# Patient Record
Sex: FEMALE | Race: WHITE
[De-identification: names, ages, dates, MRNs, and addresses within clinical notes are randomized per-mention and may not be internally consistent; named-entity substitution may affect disease eponyms.]

---

## 2018-12-25 ENCOUNTER — HOSPITAL ENCOUNTER (EMERGENCY)
Dept: HOSPITAL 97 - ER | Age: 58
Discharge: ANOTHER HOSPITAL | End: 2018-12-25
Payer: COMMERCIAL

## 2018-12-25 VITALS — OXYGEN SATURATION: 100 %

## 2018-12-25 VITALS — SYSTOLIC BLOOD PRESSURE: 97 MMHG | DIASTOLIC BLOOD PRESSURE: 72 MMHG

## 2018-12-25 VITALS — TEMPERATURE: 98.2 F

## 2018-12-25 DIAGNOSIS — K27.0: Primary | ICD-10-CM

## 2018-12-25 LAB
ALBUMIN SERPL BCP-MCNC: 2.4 G/DL (ref 3.4–5)
ALP SERPL-CCNC: 104 U/L (ref 45–117)
ALT SERPL W P-5'-P-CCNC: 110 U/L (ref 12–78)
ANISOCYTOSIS BLD QL: (no result)
AST SERPL W P-5'-P-CCNC: 227 U/L (ref 15–37)
BUN BLD-MCNC: 16 MG/DL (ref 7–18)
GLUCOSE SERPLBLD-MCNC: 154 MG/DL (ref 74–106)
HCT VFR BLD CALC: 21.7 % (ref 36–45)
LIPASE SERPL-CCNC: 88 U/L (ref 73–393)
LYMPHOCYTES # SPEC AUTO: 0.6 K/UL (ref 0.7–4.9)
MORPHOLOGY BLD-IMP: (no result)
PMV BLD: 10.5 FL (ref 7.6–11.3)
POTASSIUM SERPL-SCNC: 3.9 MMOL/L (ref 3.5–5.1)
RBC # BLD: 2.59 M/UL (ref 3.86–4.86)

## 2018-12-25 PROCEDURE — 96375 TX/PRO/DX INJ NEW DRUG ADDON: CPT

## 2018-12-25 PROCEDURE — 80048 BASIC METABOLIC PNL TOTAL CA: CPT

## 2018-12-25 PROCEDURE — 36415 COLL VENOUS BLD VENIPUNCTURE: CPT

## 2018-12-25 PROCEDURE — 83690 ASSAY OF LIPASE: CPT

## 2018-12-25 PROCEDURE — 86901 BLOOD TYPING SEROLOGIC RH(D): CPT

## 2018-12-25 PROCEDURE — 86900 BLOOD TYPING SEROLOGIC ABO: CPT

## 2018-12-25 PROCEDURE — 99285 EMERGENCY DEPT VISIT HI MDM: CPT

## 2018-12-25 PROCEDURE — 93005 ELECTROCARDIOGRAM TRACING: CPT

## 2018-12-25 PROCEDURE — 71045 X-RAY EXAM CHEST 1 VIEW: CPT

## 2018-12-25 PROCEDURE — 85025 COMPLETE CBC W/AUTO DIFF WBC: CPT

## 2018-12-25 PROCEDURE — 96374 THER/PROPH/DIAG INJ IV PUSH: CPT

## 2018-12-25 PROCEDURE — 86850 RBC ANTIBODY SCREEN: CPT

## 2018-12-25 PROCEDURE — 80076 HEPATIC FUNCTION PANEL: CPT

## 2018-12-25 NOTE — ER
Nurse's Notes                                                                                     

 [facility]                                                                

Name: Marsha Manzo                                                                                 

Age: 58 yrs                                                                                       

Sex: Female                                                                                       

: 1960                                                                                   

MRN: [id number]                                                                                   

Arrival Date: 2018                                                                          

Time: 09:53                                                                                       

Account#: [id number]                                                                            

Bed 4                                                                                             

Private MD:                                                                                       

Diagnosis: Acute peptic ulcer, site unspecified, with hemorrhage                                  

                                                                                                  

Presentation:                                                                                     

                                                                                             

09:54 Presenting complaint: EMS states: fever, cough, congestion, vomiting, dark red stool    iw  

      since Thursday. Transition of care: patient was not received from another setting of        

      care. Onset of symptoms was 2018. Risk Assessment: Do you want to hurt         

      yourself or someone else? Patient reports no desire to harm self or others. Initial         

      Sepsis Screen: Does the patient meet any 2 criteria? No. Patient's initial sepsis           

      screen is negative. Does the patient have a suspected source of infection? No.              

      Patient's initial sepsis screen is negative. Care prior to arrival: Medication(s)           

      given: Normal saline infusion, 500 mL, IV initiated. 18 GA, in the right antecubital        

      area.                                                                                       

09:54 Method Of Arrival: EMS: [facility]                                                iw  

09:54 Acuity: COLETTE 3                                                                           iw  

10:04 Acuity: COLETTE 2                                                                           hb  

                                                                                                  

Historical:                                                                                       

- Allergies:                                                                                      

10:21 No Known Drug Allergies;                                                                hb  

- PSHx:                                                                                           

09:55 ;                                                                              iw  

                                                                                                  

- Immunization history:: Adult Immunizations up to date.                                          

- Social history:: Patient/guardian denies using alcohol, street drugs, The patient               

  lives with family, Smoking status: Patient/guardian denies using tobacco.                       

- Ebola Screening: : Patient negative for fever greater than or equal to 101.5 degrees            

  Fahrenheit, and additional compatible Ebola Virus Disease symptoms Patient denies               

  exposure to infectious person Patient denies travel to an Ebola-affected area in the            

  21 days before illness onset No symptoms or risks identified at this time.                      

- Family history:: not pertinent.                                                                 

                                                                                                  

                                                                                                  

Screening:                                                                                        

10:05 Abuse screen: Denies threats or abuse. Denies injuries from another. Nutritional        hb  

      screening: No deficits noted. Tuberculosis screening: No symptoms or risk factors           

      identified. Fall Risk Total Guillory Fall Scale indicates Low Risk Score (25-44 pts). Fall     

      prevention measures have been instituted. Side Rails Up X 2 Frequent Obs/Assesments         

      occuring Family Present and informed to notify staff if they need to leave bedside As       

      available Patient and Family Educated on Fall Prevention Program and strategies.            

                                                                                                  

Assessment:                                                                                       

10:10 General: Appears in no apparent distress. ill, Behavior is calm, cooperative. Pain:     hb  

      Denies pain. Neuro: Level of Consciousness is awake, alert, obeys commands, Oriented to     

      person, place, time, situation. Cardiovascular: Capillary refill < 3 seconds Patient's      

      skin is warm and dry. Respiratory: Airway is patent Trachea midline Respiratory effort      

      is even, unlabored, Respiratory pattern is regular, symmetrical, Breath sounds are          

      clear bilaterally. GI: Abdomen is non-distended, Bowel sounds present X 4 quads. Abd is     

      soft and non tender X 4 quads. Reports rectal bleeding. : No signs and/or symptoms        

      were reported regarding the genitourinary system. EENT: No signs and/or symptoms were       

      reported regarding the EENT system. Derm: Skin is intact, is healthy with good turgor,      

      Skin is dry, Skin is pale, Skin temperature is warm. Musculoskeletal: No signs and/or       

      symptoms reported regarding the musculoskeletal system.                                     

11:00 Reassessment: Patient appears in no apparent distress at this time. No changes from     hb  

      previously documented assessment. Patient and/or family updated on plan of care and         

      expected duration. Pain level reassessed. Patient is alert, oriented x 3, equal             

      unlabored respirations, skin warm/dry/pink.                                                 

12:00 Reassessment: Patient appears in no apparent distress at this time. No changes from     hb  

      previously documented assessment. Patient and/or family updated on plan of care and         

      expected duration. Pain level reassessed. Patient is alert, oriented x 3, equal             

      unlabored respirations, skin warm/dry/pink. Family at bedside. Awaiting blood from          

      blood bank.                                                                                 

13:00 Reassessment: Awaiting blood from blood bank, family remains at bedside. SBP 80s-110,   hb  

      Dr. Urbano aware.                                                                          

13:37 Reassessment: First unit PRBCs started. Family at bedside. Transfer pending.            hb  

14:30 Reassessment: Blood infusion continues, NAD. Family remains at bedside.                 hb  

14:39 Reassessment: Report called to Cristy TELLEZ at [facility].                                        hb  

15:30 Reassessment: Second unit PRBCs started, see transfusion flowsheet.                     hb  

16:00 Reassessment: Blood infusion continues, awaiting transportation at this time. VSS. NAD. hb  

      Denies pain. No bleeding or vomiting seen this visit. Family remains at bedside.            

                                                                                                  

Vital Signs:                                                                                      

09:55 BP 91 / 68; Pulse 100; Resp 16 S; Temp 98.2; Pulse Ox 100% on R/A;                      iw  

10:04 BP 84 / 63;                                                                             hb  

10:30 BP 88 / 56; Pulse 99; Resp 18; Pulse Ox 100% on R/A;                                    hb  

11:00 BP 82 / 50; Pulse 98; Resp 16; Pulse Ox 100% on R/A; Pain 2/10;                         hb  

12:00 BP 95 / 66; Pulse 88; Resp 16; Pulse Ox 99% on R/A;                                     hb  

12:30 BP 97 / 48; Pulse 101; Resp 15; Pulse Ox 100% on R/A;                                   hb  

13:30  / 74; Pulse 91; Resp 15; Pulse Ox 100% on R/A;                                   hb  

13:45 BP 89 / 65; Pulse 91; Resp 15; Pulse Ox 100% on R/A;                                    hb  

14:15  / 68; Pulse 89; Resp 16; Pulse Ox 100% on R/A;                                   hb  

14:30 BP 97 / 72; Pulse 87; Resp 15; Pulse Ox 100% on R/A;                                    hb  

15:30  / 68; Pulse 91; Resp 16; Pulse Ox 100% on R/A;                                   hb  

16:30  / 74; Pulse 88; Resp 16; Pulse Ox 100% on R/A;                                   hb  

                                                                                                  

ED Course:                                                                                        

09:53 Patient arrived in ED.                                                                  iw  

09:55 Triage completed.                                                                       iw  

09:55 Arm band placed on.                                                                     iw  

09:58 Ana Urbano MD is Attending Physician.                                           ma2 

10:04 Maddie Marte, RN is Primary Nurse.                                                   hb  

10:05 EKG done, by EKG tech. reviewed by Ana Urbano MD.                                 at1 

10:10 Patient has correct armband on for positive identification. Placed in gown. Bed in low  hb  

      position. Call light in reach. Side rails up X2.                                            

10:10 Maintain EMS IV. Dressing intact. Good blood return noted. Site clean \T\ dry. Gauge \T\    hb

      site: 20g RIGHT AC.                                                                         

10:30 Chest Single View XRAY In Process Unspecified.                                          EDMS

12:04 contacted [facility] both declined due to lack of         bd  

      capacity.                                                                                   

12:18 contacted [facility], pt was denied for lack of capacity.                       bd  

12:24 contacted [facility], pt was denied due to lack of capacity.                   bd  

13:19 Inserted saline lock: 22 gauge in left antecubital area, using aseptic technique.       hj  

16:30 No provider procedures requiring assistance completed. Patient transferred, IV remains  hb  

      in place.                                                                                   

                                                                                                  

Administered Medications:                                                                         

10:20 Drug: Pantoprazole 80 mg Route: IVP; Site: right antecubital;                           hb  

11:00 Follow up: Response: No adverse reaction                                                hb  

10:20 Drug: Rocephin 1 grams Route: IV; Rate: calculated rate; Site: right antecubital;       hb  

11:28 Drug: Pantoprazole 8 mg/hr Route: IV; Rate: 25 ml/hr; Site: right antecubital;          hb  

                                                                                                  

                                                                                                  

Outcome:                                                                                          

13:01 ER care complete, transfer ordered by MD. shukla 

16:30 Transferred by ground EMS to [facility].                             hb  

16:30 Condition: stable                                                                           

16:30 Instructed on the need for transfer, Demonstrated understanding of instructions.            

16:46 Patient left the ED.                                                                    hb  

                                                                                                  

Signatures:                                                                                       

Dispatcher [employer]                           EDMS                                                 

Chiara Land Irene, RN                     GEOFF   iw                                                   

Geovanna Orozco, EKG Tech              EKG Tat1                                                  

Ole Coles RN RN hj Baxter, Heather, RN RN hb Alzahri, Mohammad, MD MD ma2                                                  

                                                                                                  

Corrections: (The following items were deleted from the chart)                                    

14:01 13:00 Reassessment: Awaiting blood from blood bank, family remains at bedside. SBP      hb  

      , Dr. Urbano aware. hb                                                               

                                                                                                  

**************************************************************************************************

## 2018-12-25 NOTE — XMS REPORT
[facility]

 Created on:2018



Patient:Marsha Manzo

Sex:Female

:1960

External Reference #:[id number]





Demographics







 Address  [address] [place] [address]

 

 Phone  [phone]

 

 Preferred Language  en

 

 Marital Status  Unknown

 

 Jehovah's witness Affiliation  Unknown

 

 Race  White

 

 Ethnic Group  Not  or 









Author







 Organization  [employer]









Care Team Providers







 Name  Role  Phone

 

 Iyer, Na  Provider Role  Unavailable









Allergies

No Known Allergies



Problems







 Problem Type  Condition  Code  Onset Dates  Condition Status

 

 Problem  History of GI bleed  Z87.19    Active

 

 Problem  Pain in left knee  M25.562    Active

 

 Problem  Other chronic pain  G89.29    Active

 

 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

 

 Assessment  Gastroesophageal reflux disease  K21.9    Active



   without esophagitis      

 

 Problem  Gastroesophageal reflux disease  K21.9    Active



   without esophagitis      

 

 Problem  Cyst of skin  L72.9    Active







Medications







 Medication  Code  Code  Instructions  Start  End  Status  Dosage



   System      Date  Date    

 

 Pantoprazole  NDC  17891203015  40 MG Orally  March    Active  1 tablet



 Sodium      Once a day  2018      







Results

No Known Results



Summary Purpose

[employer] Submission

## 2018-12-25 NOTE — EDPHYS
Physician Documentation                                                                           

 [facility]                                                                

Name: Marsha Manzo                                                                                 

Age: 58 yrs                                                                                       

Sex: Female                                                                                       

: 1960                                                                                   

MRN: [id number]                                                                                   

Arrival Date: 2018                                                                          

Time: 09:53                                                                                       

Account#: [id number]                                                                            

Bed 4                                                                                             

Private MD:                                                                                       

ED Physician Ana Urbano                                                                    

HPI:                                                                                              

                                                                                             

10:04 This 58 yrs old  Female presents to ER via EMS with complaints of Vomiting,    ma2 

      Bloody Stools.                                                                              

10:04 The patient presents to the emergency department with lower gi bleeding . Onset: The    ma2 

      symptoms/episode began/occurred suddenly, 2 day(s) ago. Possible causes: unknown.           

      Associated signs and symptoms: Pertinent negatives: abdominal pain, anorexia. Severity      

      of symptoms: At their worst the symptoms were moderate in the emergency department the      

      symptoms are unchanged. The patient has experienced a previous episode, hx of PUD w         

      bleeding ulcer .                                                                            

                                                                                                  

Historical:                                                                                       

- Allergies:                                                                                      

10:21 No Known Drug Allergies;                                                                hb  

- PSHx:                                                                                           

09:55 ;                                                                              iw  

                                                                                                  

- Immunization history:: Adult Immunizations up to date.                                          

- Social history:: Patient/guardian denies using alcohol, street drugs, The patient               

  lives with family, Smoking status: Patient/guardian denies using tobacco.                       

- Ebola Screening: : Patient negative for fever greater than or equal to 101.5 degrees            

  Fahrenheit, and additional compatible Ebola Virus Disease symptoms Patient denies               

  exposure to infectious person Patient denies travel to an Ebola-affected area in the            

  21 days before illness onset No symptoms or risks identified at this time.                      

- Family history:: not pertinent.                                                                 

                                                                                                  

                                                                                                  

ROS:                                                                                              

10:04 Constitutional: Negative for fever, chills, and weight loss, Neck: Negative for injury, ma2 

      pain, and swelling.                                                                         

10:04 Abdomen/GI: Positive for abdominal pain, black/tarry stool, rectal bleeding, Negative       

      for nausea and vomiting, nausea, vomiting, and diarrhea.                                    

10:04 All other systems are negative.                                                             

                                                                                                  

Exam:                                                                                             

10:04 Head/Face:  Normocephalic, atraumatic. ENT:  Nares patent. No nasal discharge, no       ma2 

      septal abnormalities noted.  Tympanic membranes are normal and external auditory canals     

      are clear.  Oropharynx with no redness, swelling, or masses, exudates, or evidence of       

      obstruction, uvula midline.  Mucous membranes moist. Chest/axilla:  Normal chest wall       

      appearance and motion.  Nontender with no deformity.  No lesions are appreciated.           

      Cardiovascular:  Regular rate and rhythm with a normal S1 and S2.  No gallops, murmurs,     

      or rubs.  Normal PMI, no JVD.  No pulse deficits. Respiratory:  Lungs have equal breath     

      sounds bilaterally, clear to auscultation and percussion.  No rales, rhonchi or wheezes     

      noted.  No increased work of breathing, no retractions or nasal flaring. Abdomen/GI:        

      Soft, non-tender, with normal bowel sounds.  No distension or tympany.  No guarding or      

      rebound.  No evidence of tenderness throughout. MS/ Extremity:  Pulses equal, no            

      cyanosis.  Neurovascular intact.  Full, normal range of motion. Neuro:  Awake and           

      alert, GCS 15, oriented to person, place, time, and situation.  Cranial nerves II-XII       

      grossly intact.  Motor strength 5/5 in all extremities.  Sensory grossly intact.            

      Cerebellar exam normal.  Normal gait.                                                       

10:04 Constitutional: The patient appears pale.                                                   

                                                                                                  

Vital Signs:                                                                                      

09:55 BP 91 / 68; Pulse 100; Resp 16 S; Temp 98.2; Pulse Ox 100% on R/A;                      iw  

10:04 BP 84 / 63;                                                                             hb  

10:30 BP 88 / 56; Pulse 99; Resp 18; Pulse Ox 100% on R/A;                                    hb  

11:00 BP 82 / 50; Pulse 98; Resp 16; Pulse Ox 100% on R/A; Pain 2/10;                         hb  

12:00 BP 95 / 66; Pulse 88; Resp 16; Pulse Ox 99% on R/A;                                     hb  

12:30 BP 97 / 48; Pulse 101; Resp 15; Pulse Ox 100% on R/A;                                   hb  

13:30  / 74; Pulse 91; Resp 15; Pulse Ox 100% on R/A;                                   hb  

13:45 BP 89 / 65; Pulse 91; Resp 15; Pulse Ox 100% on R/A;                                    hb  

14:15  / 68; Pulse 89; Resp 16; Pulse Ox 100% on R/A;                                   hb  

14:30 BP 97 / 72; Pulse 87; Resp 15; Pulse Ox 100% on R/A;                                    hb  

15:30  / 68; Pulse 91; Resp 16; Pulse Ox 100% on R/A;                                   hb  

16:30  / 74; Pulse 88; Resp 16; Pulse Ox 100% on R/A;                                   hb  

                                                                                                  

MDM:                                                                                              

10:01 Patient medically screened.                                                             ma2 

10:04 Differential diagnosis: cholecystitis, pancreatitis, appendicitis, gastroenteritis, gi  [facility] 

      bleeding.                                                                                   

10:06 Data reviewed: vital signs, nurses notes, EMS record. Counseling: I had a detailed      [facility] 

      discussion with the patient and/or guardian regarding: the historical points, exam          

      findings, and any diagnostic results supporting the discharge/admit diagnosis, the need     

      to transfer to another facility. ED course: her MAP is normal SBP 95 pulse 110 bpm i        

      recommended emergency transfer for higher level of care for emergent GI endoscope no gi     

      available in our facility.. she would like to wait before we initiate transfer until        

      her  arrives .                                                                       

12:57 Response to treatment: the patient's symptoms have mildly improved after treatment.     ma2 

12:57 ED course: patient has active GI bleed, needs emergent GI not available in our          [facility] 

      hospital.. will transfer emergently for higher level of care.. stable for transfer .        

                                                                                                  

                                                                                             

10:01 Order name: Basic Metabolic Panel; Complete Time: 10:47                                 ma2 

                                                                                             

10:01 Order name: CBC with Diff; Complete Time: 11:42                                         ma2 

                                                                                             

10:01 Order name: Creatinine for Radiology; Complete Time: 10:47                              ma2 

                                                                                             

10:01 Order name: Hepatic Function; Complete Time: 10:47                                      ma2 

                                                                                             

10:01 Order name: Lipase; Complete Time: 10:47                                                ma2 

                                                                                             

10:08 Order name: Type And Screen                                                             bd  

                                                                                             

10:01 Order name: Chest Single View XRAY; Complete Time: 11:42                                ma2 

                                                                                             

10:52 Order name: Manual Differential; Complete Time: 11:42                                   [facility]

                                                                                             

11:32 Order name: ABO rpt                                                                     [facility]

                                                                                             

11:32 Order name: Packed RBCs (Additional Unit)                                               [facility]

                                                                                             

10:01 Order name: IV Saline Lock; Complete Time: 10:27                                        ma2 

                                                                                             

10:01 Order name: Labs collected and sent; Complete Time: 10:27                               ma2 

                                                                                                  

Administered Medications:                                                                         

10:20 Drug: Pantoprazole 80 mg Route: IVP; Site: right antecubital;                           hb  

11:00 Follow up: Response: No adverse reaction                                                hb  

10:20 Drug: Rocephin 1 grams Route: IV; Rate: calculated rate; Site: right antecubital;       hb  

11:28 Drug: Pantoprazole 8 mg/hr Route: IV; Rate: 25 ml/hr; Site: right antecubital;          hb  

                                                                                                  

                                                                                                  

Disposition:                                                                                      

18 13:01 Transfer ordered to [facility]. Diagnosis is Acute peptic      

  ulcer, site unspecified, with hemorrhage.                                                       

- Reason for transfer: Higher level of care.                                                      

- Accepting physician is Rashid.                                                                   

- Condition is Stable.                                                                            

- Problem is chronic.                                                                             

- Symptoms have worsened.                                                                         

                                                                                                  

                                                                                                  

                                                                                                  

Signatures:                                                                                       

Dispatcher [employer]                           EDMS                                                 

Ceci Talavera RN RN                                                      

Maddie Marte RN RN                                                      

Ana Urbano MD MD   ma2                                                  

                                                                                                  

Corrections: (The following items were deleted from the chart)                                    

13:17 13:01 2018 13:01 Transfer ordered to [facility]. Diagnosis is ma2 

      Acute peptic ulcer, site unspecified, with hemorrhage. Reason for transfer: Higher          

      level of care. Accepting physician is BETSY. Condition is Stable. Problem is chronic.          

      Symptoms have worsened. ma2                                                                 

14:22 10:46 BiPap ([employer] Only)+RC.RAD.BRZ ordered. [facility]

16:46 13:17 2018 13:01 Transfer ordered to [facility]. Diagnosis is hb  

      Acute peptic ulcer, site unspecified, with hemorrhage. Reason for transfer: Higher          

      level of care. Accepting physician is Rashid. Condition is Stable. Problem is chronic.       

      Symptoms have worsened. ma2                                                                 

                                                                                                  

**************************************************************************************************

## 2018-12-25 NOTE — RAD REPORT
EXAM DESCRIPTION:  Juan Single View12/25/2018 10:30 am

 

CLINICAL HISTORY:  cough

 

COMPARISON:  2015

 

FINDINGS:   The lungs appear clear of acute infiltrate. The heart is normal size

 

IMPRESSION:   No acute abnormalities displayed

## 2018-12-25 NOTE — XMS REPORT
[facility]

 Created on:2018



Patient:Marsha Manzo

Sex:Female

:1960

External Reference #:[id number]





Demographics







 Address  [address] [place] [address]

 

 Phone  [phone]

 

 Preferred Language  en

 

 Marital Status  Unknown

 

 [employer] Affiliation  Unknown

 

 Race  White

 

 Ethnic Group  Unknown









Author







 Organization  [employer]









Care Team Providers







 Name  Role  Phone

 

 Justen Bebe  Provider Role  Unavailable









Allergies, Adverse Reactions, Alerts







 Substance  Reaction  Event Type

 

 N.K.D.A.  Info Not Available  Non Drug Allergy







Problems







 Problem Type  Condition  Code  Onset Dates  Condition Status

 

 Assessment  Pharyngitis, unspecified etiology  J02.9    Active

 

 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

 

 Problem  Other chronic pain  G89.29    Active

 

 Problem  Pharyngitis, unspecified etiology  J02.9    Active

 

 Problem  Gastroesophageal reflux disease  K21.9    Active



   without esophagitis      

 

 Problem  Cyst of skin  L72.9    Active

 

 Problem  History of GI bleed  Z87.19    Active

 

 Problem  Pain in left knee  M25.562    Active







Medications







 Medication  Code  Code  Instructions  Start  End  Status  Dosage



   System      Date  Date    

 

 Vitamin B-1  NDC  [address]-20        Active  not



               defined

 

 Vitamin B-12  NDC  42351-20914        Active  not



               defined

 

 Lidocaine  NDC  73316859388  2 %    Active  10 ml to



 Viscous      Mouth/Throat  2018,     for



       every 3 hrs        swish and



                swallow

 

 Medrol  NDC  40483498579  4 MG Orally as  ,    Active  as



       directed        directed

 

 Pantoprazole  NDC  32152737086  40 MG Orally      Active  1 tablet



 Sodium      Once a day        







Results







 Name  Result  Date  Reference Range  Unit  Abnormality Flag

 

 STREP A RAPID          

 

 ----Result  Negative  2018      







Summary Purpose

[employer] Submission

## 2018-12-27 NOTE — EKG
Test Date:    2018-12-25               Test Time:    09:58:22

Technician:   JANETTE                                     

                                                     

MEASUREMENT RESULTS:                                       

Intervals:                                           

Rate:         97                                     

OR:           172                                    

QRSD:         92                                     

QT:           380                                    

QTc:          482                                    

Axis:                                                

P:            39                                     

OR:           172                                    

QRS:          19                                     

T:            68                                     

                                                     

INTERPRETIVE STATEMENTS:                                       

                                                     

Normal sinus rhythm

Prolonged QT

Abnormal ECG

Compared to ECG 05/30/2015 11:18:42

Prolonged QT interval now present

Sinus tachycardia no longer present



Electronically Signed On 12-27-18 08:58:02 CST by Yeison Anguiano

## 2020-01-14 ENCOUNTER — HOSPITAL ENCOUNTER (EMERGENCY)
Dept: HOSPITAL 97 - ER | Age: 60
Discharge: ANOTHER HOSPITAL | End: 2020-01-14
Payer: COMMERCIAL

## 2020-01-14 VITALS — SYSTOLIC BLOOD PRESSURE: 113 MMHG | DIASTOLIC BLOOD PRESSURE: 81 MMHG

## 2020-01-14 VITALS — TEMPERATURE: 98.2 F | OXYGEN SATURATION: 100 %

## 2020-01-14 DIAGNOSIS — S30.1XXA: ICD-10-CM

## 2020-01-14 DIAGNOSIS — R11.0: ICD-10-CM

## 2020-01-14 DIAGNOSIS — D64.9: Primary | ICD-10-CM

## 2020-01-14 LAB
ALBUMIN SERPL BCP-MCNC: 2.2 G/DL (ref 3.4–5)
ALP SERPL-CCNC: 140 U/L (ref 45–117)
ALT SERPL W P-5'-P-CCNC: 25 U/L (ref 12–78)
ANISOCYTOSIS BLD QL: (no result)
AST SERPL W P-5'-P-CCNC: 89 U/L (ref 15–37)
BLD SMEAR INTERP: (no result)
BUN BLD-MCNC: 4 MG/DL (ref 7–18)
GLUCOSE SERPLBLD-MCNC: 126 MG/DL (ref 74–106)
HCT VFR BLD CALC: 24.1 % (ref 36–45)
HYPOCHROMIA BLD QL: (no result)
INR BLD: 1.63
LIPASE SERPL-CCNC: 63 U/L (ref 73–393)
LYMPHOCYTES # SPEC AUTO: 0.6 K/UL (ref 0.7–4.9)
MAGNESIUM SERPL-MCNC: 2 MG/DL (ref 1.8–2.4)
MORPHOLOGY BLD-IMP: (no result)
NT-PROBNP SERPL-MCNC: 76 PG/ML (ref ?–125)
PMV BLD: 8.5 FL (ref 7.6–11.3)
POTASSIUM SERPL-SCNC: 4 MMOL/L (ref 3.5–5.1)
RBC # BLD: 2.94 M/UL (ref 3.86–4.86)
TROPONIN (EMERG DEPT USE ONLY): < 0.02 NG/ML (ref 0–0.04)

## 2020-01-14 PROCEDURE — 36415 COLL VENOUS BLD VENIPUNCTURE: CPT

## 2020-01-14 PROCEDURE — 86850 RBC ANTIBODY SCREEN: CPT

## 2020-01-14 PROCEDURE — 93005 ELECTROCARDIOGRAM TRACING: CPT

## 2020-01-14 PROCEDURE — 80048 BASIC METABOLIC PNL TOTAL CA: CPT

## 2020-01-14 PROCEDURE — 84484 ASSAY OF TROPONIN QUANT: CPT

## 2020-01-14 PROCEDURE — 80076 HEPATIC FUNCTION PANEL: CPT

## 2020-01-14 PROCEDURE — 86901 BLOOD TYPING SEROLOGIC RH(D): CPT

## 2020-01-14 PROCEDURE — 99285 EMERGENCY DEPT VISIT HI MDM: CPT

## 2020-01-14 PROCEDURE — 71045 X-RAY EXAM CHEST 1 VIEW: CPT

## 2020-01-14 PROCEDURE — 85610 PROTHROMBIN TIME: CPT

## 2020-01-14 PROCEDURE — 86900 BLOOD TYPING SEROLOGIC ABO: CPT

## 2020-01-14 PROCEDURE — 30233N1 TRANSFUSION OF NONAUTOLOGOUS RED BLOOD CELLS INTO PERIPHERAL VEIN, PERCUTANEOUS APPROACH: ICD-10-PCS

## 2020-01-14 PROCEDURE — 74177 CT ABD & PELVIS W/CONTRAST: CPT

## 2020-01-14 PROCEDURE — 83735 ASSAY OF MAGNESIUM: CPT

## 2020-01-14 PROCEDURE — 83880 ASSAY OF NATRIURETIC PEPTIDE: CPT

## 2020-01-14 PROCEDURE — 83690 ASSAY OF LIPASE: CPT

## 2020-01-14 PROCEDURE — 85025 COMPLETE CBC W/AUTO DIFF WBC: CPT

## 2020-01-14 PROCEDURE — 36430 TRANSFUSION BLD/BLD COMPNT: CPT

## 2020-01-14 NOTE — XMS REPORT
[facility]

 Created on:2018



Patient:Marsha Manzo

Sex:Female

:1960

External Reference #:[id number]





Demographics







 Address  [address] [place] [address]

 

 Phone  [phone]

 

 Preferred Language  en

 

 Marital Status  Unknown

 

 [employer] Affiliation  Unknown

 

 Race  White

 

 Ethnic Group  Unknown









Author







 Organization  [employer]









Care Team Providers







 Name  Role  Phone

 

 Justen Bebe  Provider Role  Unavailable









Allergies, Adverse Reactions, Alerts







 Substance  Reaction  Event Type

 

 N.K.D.A.  Info Not Available  Non Drug Allergy







Problems







 Problem Type  Condition  Code  Onset Dates  Condition Status

 

 Assessment  Pharyngitis, unspecified etiology  J02.9    Active

 

 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

 

 Problem  Other chronic pain  G89.29    Active

 

 Problem  Pharyngitis, unspecified etiology  J02.9    Active

 

 Problem  Gastroesophageal reflux disease  K21.9    Active



   without esophagitis      

 

 Problem  Cyst of skin  L72.9    Active

 

 Problem  History of GI bleed  Z87.19    Active

 

 Problem  Pain in left knee  M25.562    Active







Medications







 Medication  Code  Code  Instructions  Start  End  Status  Dosage



   System      Date  Date    

 

 Vitamin B-1  NDC  [address]-48        Active  not



               defined

 

 Vitamin B-12  NDC  48415-40956        Active  not



               defined

 

 Lidocaine  NDC  25694018063  2 %    Active  10 ml to



 Viscous      Mouth/Throat  2018,     for



       every 3 hrs        swish and



                swallow

 

 Medrol  NDC  02372749189  4 MG Orally as  ,    Active  as



       directed        directed

 

 Pantoprazole  NDC  72846342423  40 MG Orally      Active  1 tablet



 Sodium      Once a day        







Results







 Name  Result  Date  Reference Range  Unit  Abnormality Flag

 

 STREP A RAPID          

 

 ----Result  Negative  2018      







Summary Purpose

[employer] Submission

## 2020-01-14 NOTE — XMS REPORT
Patient Summary Document

 Created on:2020



Patient:TODD WOOTEN

Sex:Female

:1960

External Reference #:[id number]





Demographics







 Address  [address] [place] [address]

 

 Home Phone  [phone]

 

 Work Phone  [phone]

 

 Email Address  NONE

 

 Preferred Language  Unknown

 

 Marital Status  Unknown

 

 Yazdanism Affiliation  Unknown

 

 Race  Unknown

 

 Additional Race(s)  Unavailable

 

 Ethnic Group  Unknown









Author







 Organization  [facility]

 

 Address  [address]



   [place] [address]

 

 Phone  [phone]









Care Team Providers







 Name  Role  Phone

 

 VIJI ORTIZ  Unavailable  Unavailable









Problems

This patient has no known problems.



Allergies, Adverse Reactions, Alerts

This patient has no known allergies or adverse reactions.



Medications

This patient has no known medications.



Results







 Test Description  Test Time  Test Comments  Text Results  Atomic Results  
Result Comments









 HEMOGLOBIN AND HEMATOCRIT  2018 13:25:00    









   Test Item  Value  Reference Range  Comments









 HEMOGLOBIN (BEAKER) (test code=410)  8.4 GM/DL  11.2-15.7  

 

 HEMATOCRIT (BEAKER) (test code=411)  26.6 %  34.1-44.9  



COMPREHENSIVE METABOLIC [id number] 07:26:00





 Test Item  Value  Reference Range  Comments

 

 TOTAL PROTEIN (BEAKER)  5.1 gm/dL  6.0-8.3  



 (test code=770)      

 

 ALBUMIN (BEAKER) (test  2.8 g/dL  3.5-5.0  



 code=1145)      

 

 ALKALINE PHOSPHATASE  78 U/L    



 (BEAKER) (test code=346)      

 

 BILIRUBIN TOTAL (BEAKER)  0.5 mg/dL  0.2-1.2  



 (test code=377)      

 

 SODIUM (BEAKER) (test  141 meq/L  136-145  



 yyhx=249)      

 

 POTASSIUM (BEAKER) (test  3.4 meq/L  3.5-5.1  



 code=379)      

 

 CHLORIDE (BEAKER) (test  110 meq/L    



 code=382)      

 

 CO2 (BEAKER) (test  26 meq/L  22-29  



 code=355)      

 

 BLOOD UREA NITROGEN  4 mg/dL  7-21  



 (BEAKER) (test code=354)      

 

 CREATININE (BEAKER) (test  0.58 mg/dL  0.57-1.25  



 code=358)      

 

 GLUCOSE RANDOM (BEAKER)  93 mg/dL    



 (test code=652)      

 

 CALCIUM (BEAKER) (test  7.8 mg/dL  8.4-10.2  



 code=697)      

 

 AST (SGOT) (BEAKER) (test  243 U/L  5-34  



 code=353)      

 

 ALT (SGPT) (BEAKER) (test  135 U/L  6-55  



 code=347)      

 

 EGFR (BEAKER) (test  106 mL/min/1.73 sq    ESTIMATED GFR IS NOT AS



 code=1092)  m    ACCURATE AS CREATININE



       CLEARANCE IN PREDICTING



       GLOMERULAR FILTRATION



       RATE. ESTIMATED GFR IS



       NOT APPLICABLE FOR



       DIALYSIS PATIENTS.



CBC (HEMOGRAM ONLY)2018 00:45:00





 Test Item  Value  Reference Range  Comments

 

 WHITE BLOOD CELL COUNT (BEAKER) (test code=775)  2.7 K/ L  3.5-10.5  

 

 RED BLOOD CELL COUNT (BEAKER) (test code=761)  2.57 M/ L  3.93-5.22  

 

 HEMOGLOBIN (BEAKER) (test code=410)  7.0 GM/DL  11.2-15.7  

 

 HEMATOCRIT (BEAKER) (test code=411)  22.6 %  34.1-44.9  

 

 MEAN CORPUSCULAR VOLUME (BEAKER) (test code=753)  87.9 fL  79.4-94.8  

 

 MEAN CORPUSCULAR HEMOGLOBIN (BEAKER) (test  27.2 pg  25.6-32.2  



 gmri=685)      

 

 MEAN CORPUSCULAR HEMOGLOBIN CONC (BEAKER) (test  31.0 GM/DL  32.2-35.5  



 code=752)      

 

 RED CELL DISTRIBUTION WIDTH (BEAKER) (test  16.1 %  11.7-14.4  



 code=412)      

 

 PLATELET COUNT (BEAKER) (test code=756)  257 K/CU MM  150-450  

 

 MEAN PLATELET VOLUME (BEAKER) (test code=754)  10.8 fL  9.4-12.3  

 

 NUCLEATED RED BLOOD CELLS (BEAKER) (test  0 /100 WBC  0-0  



 code=413)      



HEMOGLOBIN AND [id number] 18:28:00





 Test Item  Value  Reference Range  Comments

 

 HEMOGLOBIN (BEAKER) (test code=410)  7.5 GM/DL  11.2-15.7  

 

 HEMATOCRIT (BEAKER) (test code=411)  23.8 %  34.1-44.9  



[id number] 13:23:00





 Test Item  Value  Reference Range  Comments

 

 FERRITIN (BEAKER) (test code=361)  152 ng/mL  5-275  



IRON, TIBC, % SAT. (WITHOUT FERRITIN)2018 13:03:00





 Test Item  Value  Reference Range  Comments

 

 IRON (BEAKER) (test code=547)  15.0 ug/dL  40.0-160.0  

 

 TOTAL IRON BINDING CAPACITY (BEAKER) (test  305 ug/dL  250-450  



 code=769)      

 

 IRON % SATURATION (2) (BEAKER) (test code=2590)  5 %  20-55  



HEMOGLOBIN AND [id number] 12:38:00





 Test Item  Value  Reference Range  Comments

 

 HEMOGLOBIN (BEAKER) (test code=410)  7.8 GM/DL  11.2-15.7  

 

 HEMATOCRIT (BEAKER) (test code=411)  24.9 %  34.1-44.9  



[id number] 09:46:00





 Test Item  Value  Reference Range  Comments

 

 D-DIMER QUANTITATIVE (BEAKER) (test code=671)  0.53 MG/L FEU  <0.50  



Intended Use: The D-Dimer Assay can be used to aid in the diagnosis of Deep 
Vein Thrombosis (DVT) and Pulmonary Embolism Disease (PED).In patients with low 
pre-test probability, various studies concerning STA Liatest D-dimer test have 
reported that with a cutoff value of 0.50 MG/L FEU, the Negative Predictive 
Value (NPV) regarding the exclusion of thrombosis is within % 
range.[id number] 09:44:00





 Test Item  Value  Reference Range  Comments

 

 FIBRINOGEN LEVEL (BEAKER) (test code=658)  279 mg/dl  225-434  



[id number] 09:44:00





 Test Item  Value  Reference Range  Comments

 

 PROTIME (BEAKER) (test code=759)  13.6 seconds  11.7-14.7  

 

 INR (BEAKER) (test code=370)  1.0  <=5.9  

 

 PARTIAL THROMBOPLASTIN TIME (BEAKER) (test  27.3 seconds  22.5-36.0  



 code=760)      



RECOMMENDED COUMADIN/WARFARIN INR THERAPY RANGESSTANDARD DOSE: 2.0 - 3.0   
Includes: PROPHYLAXIS forvenous thrombosis, systemic embolization; TREATMENT 
for venous thrombosis and/or pulmonary embolus.HIGH RISK: Target INR is 2.5-3.5 
for patients with mechanical heart valves.CBC (HEMOGRAM ONLY)2018 09:32:00





 Test Item  Value  Reference Range  Comments

 

 WHITE BLOOD CELL COUNT (BEAKER) (test code=775)  2.4 K/ L  3.5-10.5  

 

 RED BLOOD CELL COUNT (BEAKER) (test code=761)  2.82 M/ L  3.93-5.22  

 

 HEMOGLOBIN (BEAKER) (test code=410)  7.8 GM/DL  11.2-15.7  

 

 HEMATOCRIT (BEAKER) (test code=411)  24.7 %  34.1-44.9  

 

 MEAN CORPUSCULAR VOLUME (BEAKER) (test code=753)  87.6 fL  79.4-94.8  

 

 MEAN CORPUSCULAR HEMOGLOBIN (BEAKER) (test  27.7 pg  25.6-32.2  



 lzpj=396)      

 

 MEAN CORPUSCULAR HEMOGLOBIN CONC (BEAKER) (test  31.6 GM/DL  32.2-35.5  



 code=752)      

 

 RED CELL DISTRIBUTION WIDTH (BEAKER) (test  16.2 %  11.7-14.4  



 code=412)      

 

 PLATELET COUNT (BEAKER) (test code=756)  225 K/CU MM  150-450  

 

 MEAN PLATELET VOLUME (BEAKER) (test code=754)  11.5 fL  9.4-12.3  

 

 NUCLEATED RED BLOOD CELLS (BEAKER) (test  0 /100 WBC  0-0  



 code=413)      



HEMOGLOBIN AND [id number] 05:32:00





 Test Item  Value  Reference Range  Comments

 

 HEMOGLOBIN (BEAKER) (test code=410)  7.1 GM/DL  11.2-15.7  

 

 HEMATOCRIT (BEAKER) (test code=411)  22.0 %  34.1-44.9  



CBC (HEMOGRAM ONLY)2018 05:16:00





 Test Item  Value  Reference Range  Comments

 

 WHITE BLOOD CELL COUNT (BEAKER) (test code=775)  1.3 K/ L  3.5-10.5  

 

 RED BLOOD CELL COUNT (BEAKER) (test code=761)  2.49 M/ L  3.93-5.22  

 

 HEMOGLOBIN (BEAKER) (test code=410)  6.9 GM/DL  11.2-15.7  

 

 HEMATOCRIT (BEAKER) (test code=411)  22.2 %  34.1-44.9  

 

 MEAN CORPUSCULAR VOLUME (BEAKER) (test code=753)  89.2 fL  79.4-94.8  

 

 MEAN CORPUSCULAR HEMOGLOBIN (BEAKER) (test  27.7 pg  25.6-32.2  



 fyjl=763)      

 

 MEAN CORPUSCULAR HEMOGLOBIN CONC (BEAKER) (test  31.1 GM/DL  32.2-35.5  



 code=752)      

 

 RED CELL DISTRIBUTION WIDTH (BEAKER) (test  16.1 %  11.7-14.4  



 code=412)      

 

 PLATELET COUNT (BEAKER) (test code=756)  12 K/CU MM  150-450  

 

 NUCLEATED RED BLOOD CELLS (BEAKER) (test  2 /100 WBC  0-0  



 code=413)      



COMPREHENSIVE METABOLIC [id number] 05:10:00





 Test Item  Value  Reference Range  Comments

 

 TOTAL PROTEIN (BEAKER)  5.1 gm/dL  6.0-8.3  Specimen moderately



 (test code=770)      hemolyzed

 

 ALBUMIN (BEAKER) (test  2.6 g/dL  3.5-5.0  Specimen moderately



 code=1145)      hemolyzed

 

 ALKALINE PHOSPHATASE  81 U/L    



 (BEAKER) (test code=346)      

 

 BILIRUBIN TOTAL (BEAKER)  0.4 mg/dL  0.2-1.2  Specimen moderately



 (test code=377)      hemolyzed

 

 SODIUM (BEAKER) (test  139 meq/L  136-145  



 oqkb=793)      

 

 POTASSIUM (BEAKER) (test  4.1 meq/L  3.5-5.1  Specimen moderately



 code=379)      hemolyzed

 

 CHLORIDE (BEAKER) (test  110 meq/L    



 code=382)      

 

 CO2 (BEAKER) (test  22 meq/L  22-29  



 code=355)      

 

 BLOOD UREA NITROGEN  4 mg/dL  7-21  



 (BEAKER) (test code=354)      

 

 CREATININE (BEAKER) (test  0.60 mg/dL  0.57-1.25  Specimen moderately



 code=358)      hemolyzed

 

 GLUCOSE RANDOM (BEAKER)  92 mg/dL    



 (test code=652)      

 

 CALCIUM (BEAKER) (test  7.3 mg/dL  8.4-10.2  



 code=697)      

 

 AST (SGOT) (BEAKER) (test  379 U/L  5-34  Specimen moderately



 code=353)      hemolyzed

 

 ALT (SGPT) (BEAKER) (test  161 U/L  6-55  Specimen moderately



 code=347)      hemolyzed

 

 EGFR (BEAKER) (test  102 mL/min/1.73 sq    ESTIMATED GFR IS NOT AS



 code=1092)  m    ACCURATE AS CREATININE



       CLEARANCE IN PREDICTING



       GLOMERULAR FILTRATION



       RATE. ESTIMATED GFR IS



       NOT APPLICABLE FOR



       DIALYSIS PATIENTS.



HEMOGLOBIN AND [id number] 04:46:00





 Test Item  Value  Reference Range  Comments

 

 HEMOGLOBIN (BEAKER) (test code=410)  6.9 GM/DL  11.2-15.7  

 

 HEMATOCRIT (BEAKER) (test code=411)  22.2 %  34.1-44.9  



HEMOGLOBIN AND [id number] 18:36:00





 Test Item  Value  Reference Range  Comments

 

 HEMOGLOBIN (BEAKER) (test code=410)  7.8 GM/DL  11.2-15.7  

 

 HEMATOCRIT (BEAKER) (test code=411)  24.8 %  34.1-44.9  



HEMOGLOBIN AND [id number] 13:44:00





 Test Item  Value  Reference Range  Comments

 

 HEMOGLOBIN (BEAKER) (test code=410)  7.5 GM/DL  11.2-15.7  

 

 HEMATOCRIT (BEAKER) (test code=411)  24.1 %  34.1-44.9  



U/S, ABDOMINAL, [id number] 08:24:00Abdomen limited area? Add comment if 
clarification is needed.-&gt;LiverReason for exam:-&gt;AbnormalLFTsShould this 
be performed at the bedside?-&gt;NoFINAL REPORT PATIENT ID:   [id number] 
INDICATION: 58-year-old female with abnormal liver function tests. TECHNIQUE: 
Abdominal ultrasound limited (right upper quadrant). COMPARISON: None. FINDINGS:
Pancreas to the extent visualized is unremarkable.Liver is hyperechoic and 
there is attenuation of the soundwaves by the liver.Liver contour is normal. 
Liver appears mildly enlarged.Main portal vein measures 1.3 cm in diameter. 
Gallbladder is contracted.Common bile duct not visualized. Right kidney 
measures 9.7 x 4.0 x 3.9 cm.Right kidney is lobulated, versus there being 
multifocal scarring.No discrete right kidney mass is demonstrated. There 
appears to be cortical thinning. Visualized part of the IVC 
unremarkable.Abdominal aorta unremarkable. IMPRESSION: Hepatic steatosis and 
mild hepatomegaly. Marked lobulation versus multifocal scarring of the right 
kidney with probable cortical thinning. Recommend correlation with the patient'
s history and renal function test. Signed: Lily Palumbo MDReport Verified 
Date/Time:  2018 08:24:33 Reading Location: [facility] P006J Ultrasound 
Reading Room Electronically signed by: LILY PALUMBO M.D. on 2018 08:24 [id number] 06:27:00





 Test Item  Value  Reference Range  Comments

 

 LIPASE (BEAKER) (test code=749)  33 U/L  8-78  



HEMOGLOBIN AND [id number] 06:07:00





 Test Item  Value  Reference Range  Comments

 

 HEMOGLOBIN (BEAKER) (test code=410)  7.5 GM/DL  11.2-15.7  

 

 HEMATOCRIT (BEAKER) (test code=411)  23.2 %  34.1-44.9  



[id number] 00:49:00





 Test Item  Value  Reference Range  Comments

 

 FIBRINOGEN LEVEL (BEAKER) (test code=658)  222 mg/dl  225-434  



HEMOGLOBIN AND [id number] 00:21:00





 Test Item  Value  Reference Range  Comments

 

 HEMOGLOBIN (BEAKER) (test code=410)  7.4 GM/DL  11.2-15.7  

 

 HEMATOCRIT (BEAKER) (test code=411)  22.9 %  34.1-44.9  



RAPID INFLUENZA A&amp;B [id number] 21:44:00





 Test Item  Value  Reference Range  Comments

 

 RAPID INFLUENZA A AG (BEAKER) (test  Positive  Negative, Inconclusive  



 code=1622)      

 

 RAPID INFLUENZA B AG (BEAKER) (test  Negative  Negative, Inconclusive  



 code=1623)      



HEPATITIS B SURFACE [id number] 20:49:00





 Test Item  Value  Reference Range  Comments

 

 HEPATITIS B SURFACE ANTIBODY (BEAKER) (test  < mIU/mL  <8.0  



 code=647)      



HEPATITIS A ANTIBODY, [id number] 20:49:00





 Test Item  Value  Reference Range  Comments

 

 HEPATITIS A IGG ANTIBODY (BEAKER) (test code=2797)  Reactive  Nonreactive  



HEPATITIS B SURFACE [id number] 20:47:00





 Test Item  Value  Reference Range  Comments

 

 HEPATITIS B SURFACE ANTIGEN (2) (BEAKER) (test  Nonreactive  Nonreactive  



 code=2585)      



HEPATITIS C [id number] 20:47:00





 Test Item  Value  Reference Range  Comments

 

 HEPATITIS C ANTIBODY (BEAKER) (test code=367)  Nonreactive  Nonreactive  



HEPATITIS B CORE ANTIBODY, [id number] 20:47:00





 Test Item  Value  Reference Range  Comments

 

 HEPATITIS B CORE TOTAL ANTIBODY (BEAKER) (test  Nonreactive  Nonreactive  



 code=497)      



CBC W/PLT COUNT &amp; AUTO [id number] 20:11:00





 Test Item  Value  Reference Range  Comments

 

 WHITE BLOOD CELL COUNT (BEAKER) (test code=775)  3.5 K/ L  3.5-10.5  

 

 RED BLOOD CELL COUNT (BEAKER) (test code=761)  2.89 M/ L  3.93-5.22  

 

 HEMOGLOBIN (BEAKER) (test code=410)  8.2 GM/DL  11.2-15.7  

 

 HEMATOCRIT (BEAKER) (test code=411)  25.4 %  34.1-44.9  

 

 MEAN CORPUSCULAR VOLUME (BEAKER) (test code=753)  87.9 fL  79.4-94.8  

 

 MEAN CORPUSCULAR HEMOGLOBIN (BEAKER) (test  28.4 pg  25.6-32.2  



 vdea=612)      

 

 MEAN CORPUSCULAR HEMOGLOBIN CONC (BEAKER) (test  32.3 GM/DL  32.2-35.5  



 code=752)      

 

 RED CELL DISTRIBUTION WIDTH (BEAKER) (test  15.8 %  11.7-14.4  



 code=412)      

 

 PLATELET COUNT (BEAKER) (test code=756)  152 K/CU MM  150-450  

 

 MEAN PLATELET VOLUME (BEAKER) (test code=754)  11.9 fL  9.4-12.3  

 

 NUCLEATED RED BLOOD CELLS (BEAKER) (test  0 /100 WBC  0-0  



 code=413)      



(CELLAVISION MANUAL DIFF)2018 20:11:00





 Test Item  Value  Reference Range  Comments

 

 NEUTROPHILS - REL (CELLAVISION)(BEAKER) (test  73 %    



 code=2816)      

 

 LYMPHOCYTES - REL (CELLAVISION)(BEAKER) (test  13 %    



 code=2817)      

 

 MONOCYTES - REL (CELLAVISION)(BEAKER) (test  9 %    



 code=2818)      

 

 BASOPHILS - REL (CELLAVISION)(BEAKER) (test  1 %    



 code=2820)      

 

 BANDS - REL (CELLAVISION)(BEAKER) (test code=2826)  4 %  0-10  

 

 NEUTROPHILS - ABS (CELLAVISION)(BEAKER) (test  2.56 K/ul  1.56-6.13  



 code=2830)      

 

 LYMPHOCYTES - ABS (CELLAVISION)(BEAKER) (test  0.46 K/ul  1.18-3.74  



 code=2831)      

 

 MONOCYTES - ABS (CELLAVISION)(BEAKER) (test  0.32 K/uL  0.24-0.36  



 code=2832)      

 

 BASOPHILS - ABS (CELLAVISION)(BEAKER) (test  0.04 K/uL  0.01-0.08  



 code=2835)      

 

 BANDS - ABS (CELLAVISION)(BEAKER) (test code=2840)  0.14 K/uL  0.00-0.80  

 

 TOTAL COUNTED (BEAKER) (test code=1351)  100    

 

 WBC MORPHOLOGY (BEAKER) (test code=487)  Normal    

 

 GIANT PLATELETS (BEAKER) (test code=313)  Present    

 

 ANISOCYTOSIS (BEAKER) (test code=961)  1+ few    

 

 MICROCYTES (BEAKER) (test code=965)  1+ few    

 

 POIKILOCYTES (BEAKER) (test code=966)  1+ few    

 

 ELLIPTOCYTES (BEAKER) (test code=962)  1+ few    

 

 ARTIFACT (CELLAVISION)(BEAKER) (test code=3432)  Present    

 

 PLATELET CONCENTRATION (CELLAVISION)(BEAKER) (test  Adequate    



 mvkj=8398)      



Received comment: User comments: Slide comments:COMPREHENSIVE METABOLIC 
[id number] 19:26:00





 Test Item  Value  Reference Range  Comments

 

 TOTAL PROTEIN (BEAKER)  4.9 gm/dL  6.0-8.3  



 (test code=770)      

 

 ALBUMIN (BEAKER) (test  2.7 g/dL  3.5-5.0  



 code=1145)      

 

 ALKALINE PHOSPHATASE  81 U/L    



 (BEAKER) (test code=346)      

 

 BILIRUBIN TOTAL (BEAKER)  0.7 mg/dL  0.2-1.2  



 (test code=377)      

 

 SODIUM (BEAKER) (test  139 meq/L  136-145  



 xddt=837)      

 

 POTASSIUM (BEAKER) (test  3.8 meq/L  3.5-5.1  



 code=379)      

 

 CHLORIDE (BEAKER) (test  110 meq/L    



 code=382)      

 

 CO2 (BEAKER) (test  24 meq/L  22-29  



 code=355)      

 

 BLOOD UREA NITROGEN  12 mg/dL  7-21  



 (BEAKER) (test code=354)      

 

 CREATININE (BEAKER) (test  0.62 mg/dL  0.57-1.25  



 code=358)      

 

 GLUCOSE RANDOM (BEAKER)  98 mg/dL    



 (test code=652)      

 

 CALCIUM (BEAKER) (test  7.6 mg/dL  8.4-10.2  



 code=697)      

 

 AST (SGOT) (BEAKER) (test  263 U/L  5-34  



 code=353)      

 

 ALT (SGPT) (BEAKER) (test  105 U/L  6-55  



 code=347)      

 

 EGFR (BEAKER) (test  99 mL/min/1.73 sq m    ESTIMATED GFR IS NOT AS



 code=1092)      ACCURATE AS CREATININE



       CLEARANCE IN PREDICTING



       GLOMERULAR FILTRATION



       RATE. ESTIMATED GFR IS



       NOT APPLICABLE FOR



       DIALYSIS PATIENTS.



BILIRUBIN, [id number] 19:21:00





 Test Item  Value  Reference Range  Comments

 

 BILIRUBIN DIRECT (BEAKER) (test code=706)  0.4 mg/dL  0.1-0.5  



LACTIC ACID, VENOUS, WHOLE [id number] 19:16:00





 Test Item  Value  Reference Range  Comments

 

 LACTATE BLOOD VENOUS (2) (BEAKER) (test  0.8 mmol/L  0.5-2.2  



 code=6112)      



PROTHROMBIN [id number] 19:14:00





 Test Item  Value  Reference Range  Comments

 

 PROTIME (BEAKER) (test code=759)  14.2 seconds  11.7-14.7  

 

 INR (BEAKER) (test code=370)  1.1  <=5.9  



RECOMMENDED COUMADIN/WARFARIN INR THERAPY RANGESSTANDARD DOSE: 2.0 - 3.0   
Includes: PROPHYLAXIS forvenous thrombosis, systemic embolization; TREATMENT 
for venous thrombosis and/or pulmonary embolus.HIGH RISK: Target INR is 2.5-3.5 
for patients with mechanical heart valves.HEMOGLOBIN AND [id number] 19
:10:00





 Test Item  Value  Reference Range  Comments

 

 HEMOGLOBIN (BEAKER) (test code=410)  8.2 GM/DL  11.2-15.7  

 

 HEMATOCRIT (BEAKER) (test code=411)  25.4 %  34.1-44.9

## 2020-01-14 NOTE — RAD REPORT
EXAM DESCRIPTION:  CTAbdomen   Pelvis W Contrast - 1/14/2020 7:53 pm

 

CLINICAL HISTORY:  Abdominal pain.

Abd pain;Abdominal distention

 

COMPARISON:  CT ABD PELVIS W CONTRAST dated 5/30/2015

 

TECHNIQUE:  Biphasic CT imaging of the abdomen and pelvis was performed with 100 ml non-ionic IV cont
rast.

 

All CT scans are performed using dose optimization technique as appropriate and may include automated
 exposure control or mA/KV adjustment according to patient size.

 

FINDINGS:  The lung bases are clear.

 

Advanced fatty liver is seen with mild hepatomegaly. Postsurgical changes are present about the stoma
ch. The spleen is mildly enlarged. The pancreas, adrenal glands and kidneys show no acute process. Ar
eas of cortical scarring are present involving the right kidney.

 

A quite large right sided rectus sheath hematoma is identified measuring 4.7 x 10.0 x 30.0 cm (AP x T
 x CC). This extends from the level of the right upper quadrant to the anterior aspect of the pelvis.


 

No bowel obstruction, free air, free fluid or abscess. Trace pelvic free fluid. The appendix is not i
dentified as a discrete structure, however, no secondary findings of appendicitis are identified.   N
o evidence of significant lymphadenopathy.

 

No suspicious bony findings.

 

IMPRESSION:  Very large right-sided rectus sheath hematoma is suspected as detailed.

 

Advanced fatty liver with hepatomegaly.

## 2020-01-14 NOTE — RAD REPORT
EXAM DESCRIPTION:  RAD - Chest Single View - 1/14/2020 7:33 pm

 

CLINICAL HISTORY:  DYSPNEA

Chest pain.

 

COMPARISON:  Chest Single View dated 12/25/2018; CHEST SINGLE VIEW dated 5/30/2015; CHEST PA AND LAT 
2 VIEW dated 4/27/2015; CHEST SINGLE VIEW dated 8/7/2009

 

FINDINGS:  Portable technique limits examination quality.

 

The lungs are underinflated resulting in vascular crowding. The heart is normal in size. No displaced
 fractures.

 

IMPRESSION:  Underinflated lungs.

## 2020-01-14 NOTE — EDPHYS
Physician Documentation                                                                           

 [facility]                                                                 

Name: Marsha Manzo                                                                                 

Age: 59 yrs                                                                                       

Sex: Female                                                                                       

: 1960                                                                                   

MRN: [id number]                                                                                   

Arrival Date: 2020                                                                          

Time: 18:55                                                                                       

Account#: [id number]                                                                            

Bed 8                                                                                             

Private MD:                                                                                       

ED Physician Roger Syed                                                                      

HPI:                                                                                              

                                                                                             

19:49 This 59 yrs old  Female presents to ER via EMS with complaints of Abdominal    jr8 

      Pain.                                                                                       

19:49 The patient presents with abdominal pain that is diffuse. Onset: The symptoms/episode   jr8 

      began/occurred acutely, today. The symptoms do not radiate. Associated signs and            

      symptoms: Pertinent positives: nausea. The symptoms are described as stabbing.              

      Modifying factors: The symptoms are alleviated by nothing, the symptoms are aggravated      

      by nothing. Severity of pain: At its worst the pain was moderate in the emergency           

      department the pain is unchanged. The patient has not experienced similar symptoms in       

      the past. The patient has not recently seen a physician. Sudden onset abdominal pain        

      that is not going away. Denies vomiting or diarrhea. Currently on no medication.            

      History of alcohol abuse .                                                                  

                                                                                                  

Historical:                                                                                       

- Allergies:                                                                                      

19:00 No Known Allergies;                                                                     em  

- Home Meds:                                                                                      

19:00 None [Active];                                                                          em  

- PMHx:                                                                                           

19:00 bleeding ulcers;                                                                        em  

- PSHx:                                                                                           

19:00 ; Gastric Bypass;                                                              em  

                                                                                                  

- Immunization history:: Adult Immunizations not up to date, Last tetanus immunization:           

  up to date Flu vaccine is not up to date.                                                       

- Social history:: Smoking status: Patient/guardian denies using tobacco.                         

- Ebola Screening: : Patient negative for fever greater than or equal to 101.5 degrees            

  Fahrenheit, and additional compatible Ebola Virus Disease symptoms Patient denies               

  exposure to infectious person Patient denies travel to an Ebola-affected area in the            

  21 days before illness onset No symptoms or risks identified at this time.                      

                                                                                                  

                                                                                                  

ROS:                                                                                              

19:49 Eyes: Negative for injury, pain, redness, and discharge, ENT: Negative for injury,      jr8 

      pain, and discharge, Neck: Negative for injury, pain, and swelling, Cardiovascular:         

      Negative for chest pain, palpitations, and edema, Respiratory: Negative for shortness       

      of breath, cough, wheezing, and pleuritic chest pain, Back: Negative for injury and         

      pain, MS/Extremity: Negative for injury and deformity, Skin: Negative for injury, rash,     

      and discoloration, Neuro: Negative for headache, weakness, numbness, tingling, and          

      seizure.                                                                                    

19:49 Abdomen/GI: Positive for abdominal pain, nausea, abdominal distension, Negative for         

      vomiting, diarrhea, constipation, hematemesis, black/tarry stool, rectal bleeding.          

                                                                                                  

Exam:                                                                                             

19:49 Eyes:  Pupils equal round and reactive to light, extra-ocular motions intact.  Lids and jr8 

      lashes normal.  Conjunctiva and sclera are non-icteric and not injected.  Cornea within     

      normal limits.  Periorbital areas with no swelling, redness, or edema. ENT:  Nares          

      patent. No nasal discharge, no septal abnormalities noted.  Tympanic membranes are          

      normal and external auditory canals are clear.  Oropharynx with no redness, swelling,       

      or masses, exudates, or evidence of obstruction, uvula midline.  Mucous membranes           

      moist. Neck:  Trachea midline, no thyromegaly or masses palpated, and no cervical           

      lymphadenopathy.  Supple, full range of motion without nuchal rigidity, or vertebral        

      point tenderness.  No Meningismus. Cardiovascular:  Regular rate and rhythm with a          

      normal S1 and S2.  No gallops, murmurs, or rubs.  Normal PMI, no JVD.  No pulse             

      deficits. Respiratory:  Lungs have equal breath sounds bilaterally, clear to                

      auscultation and percussion.  No rales, rhonchi or wheezes noted.  No increased work of     

      breathing, no retractions or nasal flaring. Back:  No spinal tenderness.  No                

      costovertebral tenderness.  Full range of motion. Skin:  cool, dry with normal turgor.      

      No rashes, no lesions, and no evidence of cellulitis. MS/ Extremity:  Pulses equal, no      

      cyanosis.  Neurovascular intact.  Full, normal range of motion. Neuro:  Awake and           

      alert, GCS 15, oriented to person, place, time, and situation.  Cranial nerves II-XII       

      grossly intact.  Motor strength 5/5 in all extremities.  Sensory grossly intact.            

      Cerebellar exam normal.  Normal gait.                                                       

19:49 Constitutional: The patient appears alert, awake, obviously ill, in obvious pain, pale.     

19:49 Abdomen/GI: Inspection: distension, that is mild, Bowel sounds: active, all quadrants,      

      Palpation: soft, in the left upper quadrant and left lower quadrant, firm, almost mass      

      like to right upper and lower quadrant of abdomen, moderate abdominal tenderness, in        

      the right upper quadrant and right lower quadrant, Liver: is enlarged.                      

                                                                                                  

Vital Signs:                                                                                      

19:00  / 73; Pulse 93; Resp 20; Temp 98.2(O); Pulse Ox 100% on R/A; Weight 72.57 kg;    em  

      Height 5 ft. 6 in. (167.64 cm); Pain 10/10;                                                 

20:00  / 68; Pulse 95; Resp 18; Pulse Ox 100% on R/A;                                   rv  

20:09 Pain 8/10;                                                                              rv  

20:30  / 72; Pulse 98; Resp 17; Pulse Ox 100% on R/A;                                   rv  

21:00  / 78; Pulse 94; Resp 17; Pulse Ox 100% on R/A;                                   rv  

21:30  / 70; Pulse 94; Resp 18; Pulse Ox 100% on R/A;                                   rv  

22:00  / 81; Pulse 94; Resp 19; Pulse Ox 100% on R/A;                                   rv  

22:30  / 81; Pulse 93; Resp 21; Pulse Ox 100% on R/A;                                   rv  

19:00 Body Mass Index 25.82 (72.57 kg, 167.64 cm)                                             em  

                                                                                                  

MDM:                                                                                              

19:01 Patient medically screened.                                                             jr8 

20:26 ED course: Consulted Dr. Hunter about patient who recommended transfer at this time for  jr8 

      IR capabilities .                                                                           

20:53 Data reviewed: vital signs, nurses notes, lab test result(s), radiologic studies, CT    jr8 

      scan. Data interpreted: Pulse oximetry: on room air is 100 %. Interpretation: normal.       

      Counseling: I had a detailed discussion with the patient and/or guardian regarding: the     

      historical points, exam findings, and any diagnostic results supporting the                 

      discharge/admit diagnosis, lab results, radiology results, the need to transfer to          

      another facility. ED course: Spoke with a Dr. Alfaro at [facility] who accepted to SICU.       

                                                                                                  

                                                                                             

19:07 Order name: Basic Metabolic Panel; Complete Time: 19:44                                                                                                                              

19:07 Order name: CBC with Diff; Complete Time: 21:08                                                                                                                                      

19:07 Order name: Creatinine for Radiology; Complete Time: 20:00                                                                                                                           

19:07 Order name: Hepatic Function; Complete Time: 19:44                                                                                                                                   

19:07 Order name: Lipase; Complete Time: 19:44                                                                                                                                             

19:13 Order name: Magnesium; Complete Time: 20:17                                                                                                                                          

19:13 Order name: NT PRO-BNP; Complete Time: 20:17                                                                                                                                         

19:13 Order name: PT-INR; Complete Time: 19:44                                                                                                                                             

19:13 Order name: Troponin (emerg Dept Use Only); Complete Time: 20:17                                                                                                                     

19:13 Order name: TS                                                                                                                                                                       

20:27 Order name: Packed RBC Leukored                                                         [facility]

                                                                                             

19:07 Order name: IV Saline Lock; Complete Time: 19:07                                          

                                                                                             

19:07 Order name: Labs collected and sent; Complete Time: 19:07                                 

                                                                                             

19:13 Order name: XRAY Chest (1 view); Complete Time: 19:44                                                                                                                                

19:13 Order name: EKG; Complete Time: 19:14                                                                                                                                                

19:13 Order name: Cardiac monitoring; Complete Time: 19:23                                                                                                                                 

19:13 Order name: EKG - Nurse/Tech; Complete Time: 20:50                                                                                                                                   

19:13 Order name: O2 Per Protocol; Complete Time: 19:23                                                                                                                                    

19:13 Order name: O2 Sat Monitoring; Complete Time: 19:23                                                                                                                                  

19:15 Order name: CT Abd/Pelvis - IV Contrast Only; Complete Time: 20:04                                                                                                                   

20:34 Order name: CBC Smear Scan; Complete Time: 21:08                                        EDMS

                                                                                                  

Administered Medications:                                                                         

19:23 Drug: NS 0.9% 1000 ml Route: IV; Rate: 75 ml/hr; Site: right antecubital;               rv  

19:23 Drug: fentaNYL (PF) 50 mcg {Note: RASS 0.} Route: IVP; Site: right antecubital;         rv  

20:09 Follow up: Pain 8/10 Adult; Response: No adverse reaction; Marked relief of symptoms;   rv  

      Pain is decreased; RASS: Alert and Calm (0)                                                 

19:23 Drug: Zofran 4 mg Route: IVP; Site: right antecubital;                                  rv  

20:09 Follow up: Response: No adverse reaction                                                rv  

20:08 Drug: fentaNYL (PF) 50 mcg {Note: rass 0.} Route: IVP; Site: right antecubital;         rv  

20:50 Follow up: Response: No adverse reaction; Marked relief of symptoms; Pain is decreased; rv  

      RASS: Alert and Calm (0)                                                                    

22:33 Drug: fentaNYL (PF) 50 mcg {Note: rass 0.} Route: IVP; Site: left forearm;              rv  

22:33 Follow up: Response: Medication administered at discharge.                              rv  

                                                                                                  

                                                                                                  

Disposition:                                                                                      

01/15                                                                                             

07:12 Co-signature as Attending Physician, Roger Syed MD I agree with the assessment and  koffi 

      plan of care.                                                                               

                                                                                                  

Disposition:                                                                                      

20 20:55 Transfer ordered to [facility]. Diagnosis are Acute Anemia,    

  Rectus Abdominal Hematoma .                                                                     

- Reason for transfer: Higher level of care.                                                      

- Accepting physician is Dr. Sara Alfaro.                                                       

- Condition is Fair.                                                                              

- Problem is new.                                                                                 

- Symptoms have improved.                                                                         

                                                                                                  

                                                                                                  

                                                                                                  

Signatures:                                                                                       

Dispatcher [employer]                           EDMS                                                 

Roger Syed MD MD cha Munoz, Edgar, RN                        RN   Esequiel Hunt PA PA   jr8                                                  

Danny Abernathy RN                    RN   rv                                                   

                                                                                                  

Corrections: (The following items were deleted from the chart)                                    

                                                                                             

20:26 20:18 PACKED RBC LEUKORED AS-[id number] ordered. [facility]                                 EDMS

20:26 20:19 ABO/RH typing ordered. [facility]                                                       EDMS

20:26 20:19 Antibody Screen ordered. [facility]                                                     EDMS

22:36 20:55 2020 20:55 Transfer ordered to [facility]. Diagnosis is rv  

      Acute Anemia; Rectus Abdominal Hematoma . Reason for transfer: Higher level of care.        

      Accepting physician is Dr. Sara Alfaro. Condition is Fair. Problem is new. Symptoms       

      have improved. jr8                                                                          

                                                                                                  

**************************************************************************************************

## 2020-01-14 NOTE — ER
Nurse's Notes                                                                                     

 [facility]                                                                 

Name: Marsha Manzo                                                                                 

Age: 59 yrs                                                                                       

Sex: Female                                                                                       

: 1960                                                                                   

MRN: [id number]                                                                                   

Arrival Date: 2020                                                                          

Time: 18:55                                                                                       

Account#: [id number]                                                                            

Bed 8                                                                                             

Private MD:                                                                                       

Diagnosis: Acute Anemia;Rectus Abdominal Hematoma                                                 

                                                                                                  

Presentation:                                                                                     

                                                                                             

18:56 Presenting complaint: EMS states: abdominal pain since Saturday that has been getting   em  

      worse, denies fever, N/V/D, was given 900 mL NS during transportation, 18 G RAC.            

      Transition of care: patient was not received from another setting of care. Onset of         

      symptoms was 2020. Risk Assessment: Do you want to hurt yourself or someone     

      else? Patient reports no desire to harm self or others. Initial Sepsis Screen: Does the     

      patient meet any 2 criteria? HR > 90 bpm. No. Patient's initial sepsis screen is            

      negative. Does the patient have a suspected source of infection? No. Patient's initial      

      sepsis screen is negative. Care prior to arrival: None.                                     

18:56 Method Of Arrival: EMS: [facility]                                                em  

18:56 Acuity: [facility]LE[facility] 3                                                                           em  

                                                                                                  

Historical:                                                                                       

- Allergies:                                                                                      

19:00 No Known Allergies;                                                                     em  

- Home Meds:                                                                                      

19:00 None [Active];                                                                          em  

- PMHx:                                                                                           

19:00 bleeding ulcers;                                                                        em  

- PSHx:                                                                                           

19:00 ; Gastric Bypass;                                                              em  

                                                                                                  

- Immunization history:: Adult Immunizations not up to date, Last tetanus immunization:           

  up to date Flu vaccine is not up to date.                                                       

- Social history:: Smoking status: Patient/guardian denies using tobacco.                         

- Ebola Screening: : Patient negative for fever greater than or equal to 101.5 degrees            

  Fahrenheit, and additional compatible Ebola Virus Disease symptoms Patient denies               

  exposure to infectious person Patient denies travel to an Ebola-affected area in the            

  21 days before illness onset No symptoms or risks identified at this time.                      

                                                                                                  

                                                                                                  

Screenin:08 Abuse screen: Denies threats or abuse. Denies injuries from another. Nutritional        rv  

      screening: No deficits noted. Tuberculosis screening: No symptoms or risk factors           

      identified. Fall Risk None identified.                                                      

                                                                                                  

Assessment:                                                                                       

19:07 General: Appears in no apparent distress. uncomfortable, Behavior is calm, cooperative. rv  

      Pain: Complains of pain in abdomen. Neuro: Level of Consciousness is awake, alert,          

      obeys commands, Oriented to person, place, time, situation. Cardiovascular: Patient's       

      skin is warm and dry. Respiratory: Airway is patent. GI: Abdomen is round Bowel sounds      

      present X 4 quads. Abd is soft and non tender Reports lower abdominal pain, upper           

      abdominal pain.                                                                             

21:38 Reassessment: initiated blood transfusion at 2130. counter checking done with nurse     vanessa Pace.                                                                                      

22:35 Reassessment: first unit of bag done, transfused as bolus. second bag started before    rv  

      transfer. no signs of reaction to the blood transfusion.                                    

                                                                                                  

Vital Signs:                                                                                      

19:00  / 73; Pulse 93; Resp 20; Temp 98.2(O); Pulse Ox 100% on R/A; Weight 72.57 kg;    em  

      Height 5 ft. 6 in. (167.64 cm); Pain 10/10;                                                 

20:00  / 68; Pulse 95; Resp 18; Pulse Ox 100% on R/A;                                   rv  

20:09 Pain 8/10;                                                                              rv  

20:30  / 72; Pulse 98; Resp 17; Pulse Ox 100% on R/A;                                   rv  

21:00  / 78; Pulse 94; Resp 17; Pulse Ox 100% on R/A;                                   rv  

21:30  / 70; Pulse 94; Resp 18; Pulse Ox 100% on R/A;                                   rv  

22:00  / 81; Pulse 94; Resp 19; Pulse Ox 100% on R/A;                                   rv  

22:30  / 81; Pulse 93; Resp 21; Pulse Ox 100% on R/A;                                   rv  

19:00 Body Mass Index 25.82 (72.57 kg, 167.64 cm)                                             em  

                                                                                                  

ED Course:                                                                                        

18:55 Patient arrived in ED.                                                                  aa5 

18:58 Esequiel Hastings PA is PHCP.                                                               jr8 

18:58 Roger Syed MD is Attending Physician.                                             jr8 

18:59 Triage completed.                                                                       em  

19:00 Arm band placed on.                                                                     em  

19:04 Danny Abernathy, RN is Primary Nurse.                                                  rv  

19:07 Maintain EMS IV. Dressing intact. Good blood return noted. Site clean \T\ dry. Gauge \T\    rv

      site: G18 RIGHT AC.                                                                         

19:09 Patient has correct armband on for positive identification. Pulse ox on. NIBP on.       rv  

19:20 Radiology exam delayed due to lab results not completed at this time. (BUN/Creatinine). vm2 

19:35 XRAY Chest (1 view) In Process Unspecified.                                             EDMS

19:53 CT Abd/Pelvis - IV Contrast Only In Process Unspecified.                                EDMS

22:34 No provider procedures requiring assistance completed. Patient transferred, IV remains  rv  

      in place.                                                                                   

                                                                                                  

Administered Medications:                                                                         

19:23 Drug: NS 0.9% 1000 ml Route: IV; Rate: 75 ml/hr; Site: right antecubital;               rv  

19:23 Drug: fentaNYL (PF) 50 mcg {Note: RASS 0.} Route: IVP; Site: right antecubital;         rv  

20:09 Follow up: Pain 8/10 Adult; Response: No adverse reaction; Marked relief of symptoms;   rv  

      Pain is decreased; RASS: Alert and Calm (0)                                                 

19:23 Drug: Zofran 4 mg Route: IVP; Site: right antecubital;                                  rv  

20:09 Follow up: Response: No adverse reaction                                                rv  

20:08 Drug: fentaNYL (PF) 50 mcg {Note: rass 0.} Route: IVP; Site: right antecubital;         rv  

20:50 Follow up: Response: No adverse reaction; Marked relief of symptoms; Pain is decreased; rv  

      RASS: Alert and Calm (0)                                                                    

22:33 Drug: fentaNYL (PF) 50 mcg {Note: rass 0.} Route: IVP; Site: left forearm;              rv  

22:33 Follow up: Response: Medication administered at discharge.                              rv  

                                                                                                  

                                                                                                  

Outcome:                                                                                          

20:55 ER care complete, transfer ordered by MD. smith 

22:35 Transferred by ground EMS to [facility], Transfer form completed.    rv  

      X-rays sent w/ patient.                                                                     

22:35 Condition: stable                                                                           

22:35 Instructed on the need for transfer.                                                        

22:36 Patient left the ED.                                                                    rv  

                                                                                                  

Signatures:                                                                                       

Dispatcher [employer]                           EDNixon Youssef RN                        Karyna Farmer RN                     RN   [id number]                                                  

Esequiel Hastings PA PA   8                                                  

Anna Edwards                            [facility]                                                  

Danny Abernathy RN RN   rv                                                   

                                                                                                  

**************************************************************************************************

## 2020-01-14 NOTE — XMS REPORT
[facility]

 Created on:2018



Patient:Marsha Manzo

Sex:Female

:1960

External Reference #:[id number]





Demographics







 Address  [address] [place] [address]

 

 Phone  [phone]

 

 Preferred Language  en

 

 Marital Status  Unknown

 

 Buddhist Affiliation  Unknown

 

 Race  White

 

 Ethnic Group  Not  or 









Author







 Organization  [employer]









Care Team Providers







 Name  Role  Phone

 

 Iyer, Na  Provider Role  Unavailable









Allergies

No Known Allergies



Problems







 Problem Type  Condition  Code  Onset Dates  Condition Status

 

 Problem  History of GI bleed  Z87.19    Active

 

 Problem  Pain in left knee  M25.562    Active

 

 Problem  Other chronic pain  G89.29    Active

 

 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

 

 Assessment  Gastroesophageal reflux disease  K21.9    Active



   without esophagitis      

 

 Problem  Gastroesophageal reflux disease  K21.9    Active



   without esophagitis      

 

 Problem  Cyst of skin  L72.9    Active







Medications







 Medication  Code  Code  Instructions  Start  End  Status  Dosage



   System      Date  Date    

 

 Pantoprazole  NDC  68727334901  40 MG Orally  March    Active  1 tablet



 Sodium      Once a day  2018      







Results

No Known Results



Summary Purpose

[employer] Submission

## 2020-01-14 NOTE — XMS REPORT
[facility]

 Created on:2018



Patient:Marsha Manzo

Sex:Female

:1960

External Reference #:[id number]





Demographics







 Address  [address] [place] [address]

 

 Phone  [phone]

 

 Preferred Language  en

 

 Marital Status  Unknown

 

 Yazidi Affiliation  Unknown

 

 Race  White

 

 Ethnic Group  Not  or 









Author







 Organization  [employer]









Care Team Providers







 Name  Role  Phone

 

 Iyer, Na  Provider Role  Unavailable









Allergies, Adverse Reactions, Alerts







 Substance  Reaction  Event Type

 

 N.K.D.A.  Info Not Available  Non Drug Allergy







Problems







 Problem Type  Condition  Code  Onset Dates  Condition Status

 

 Assessment  Symptomatic spider varicose vein  I83.899    Active

 

 Assessment  Other chronic pain  G89.29    Active

 

 Assessment  Gastroesophageal reflux disease  K21.9    Active



   without esophagitis      

 

 Assessment  Cyst of skin  L72.9    Active

 

 Assessment  History of GI bleed  Z87.19    Active

 

 Problem  History of GI bleed  Z87.19    Active

 

 Problem  Pain in left knee  M25.562    Active

 

 Problem  Other chronic pain  G89.29    Active

 

 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

 

 Assessment  Pain in left knee  M25.562    Active

 

 Problem  Gastroesophageal reflux disease  K21.9    Active



   without esophagitis      

 

 Problem  Cyst of skin  L72.9    Active







Medications







 Medication  Code  Code  Instructions  Start  End  Status  Dosage



   System      Date  Date    

 

 Pantoprazole  NDC  [id number]  40 MG Orally  March    Active  1 tablet



 Sodium      Once a day  2018      







Results

No Known Results



Summary Purpose

[employer] Submission

## 2020-01-14 NOTE — XMS REPORT
[facility]

 Created on:2019



Patient:Marsha Manzo

Sex:Female

:1960

External Reference #:[id number]





Demographics







 Address  [address] [place] [address]

 

 Phone  [phone]

 

 Preferred Language  en

 

 Marital Status  Unknown

 

 Amish Affiliation  Unknown

 

 Race  White

 

 Ethnic Group  Unknown









Author







 Organization  [employer]









Care Team Providers







 Name  Role  Phone

 

 Iyer, Na  Provider Role  Unavailable









Allergies, Adverse Reactions, Alerts







 Substance  Reaction  Event Type

 

 N.K.D.A.  Info Not Available  Non Drug Allergy







Problems







 Problem Type  Condition  Code  Onset Dates  Condition Status

 

 Problem  Gastroesophageal reflux disease  K21.9    Active



   without esophagitis      

 

 Problem  Cyst of skin  L72.9    Active

 

 Problem  Iron deficiency anemia due to  D50.0    Active



   chronic blood loss      

 

 Problem  Pharyngitis, unspecified etiology  J02.9    Active

 

 Problem  PUD (peptic ulcer disease)  K27.9    Active

 

 Problem  History of GI bleed  Z87.19    Active

 

 Problem  Pain in left knee  M25.562    Active

 

 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

 

 Problem  Other chronic pain  G89.29    Active

 

 Assessment  Anxiety  F41.9    Active

 

 Assessment  Reactive depression  F32.9    Active

 

 Assessment  Encounter for vitamin deficiency  Z13.21    Active



   screening      

 

 Assessment  Screening cholesterol level  Z13.220    Active

 

 Assessment  Other chronic pain  G89.29    Active

 

 Assessment  Gastroesophageal reflux disease  K21.9    Active



   without esophagitis      

 

 Assessment  History of GI bleed  Z87.19    Active

 

 Assessment  PUD (peptic ulcer disease)  K27.9    Active

 

 Assessment  Pain in left knee  M25.562    Active

 

 Assessment  Iron deficiency anemia due to  D50.0    Active



   chronic blood loss      







Medications







 Medication  Code  Code  Instructions  Start  End  Status  Dosage



   System      Date  Date    

 

 Pantoprazole  NDC  20291118332  40 MG Orally      Active  1 tablet



 Sodium      Once a day        

 

 Duloxetine HCl  NDC  09085892861  30 MG Orally  ,    Active  1 capsule



       Once a day        

 

 Vitamin B-12  NDC  83276-45724        Active  not defined

 

 Vitamin B-1  NDC  79009-99504        Active  not defined

 

 Medrol  NDC  [id number]  4 MG Orally as  ,    Active  as directed



       directed        

 

 Pantoprazole  NDC  [id number]  40 MG Orally      Active  1 tablet



 Sodium      Once a day        







Results

No Known Results



Summary Purpose

[employer] Submission

## 2020-01-15 NOTE — EKG
Test Date:    2020-01-14               Test Time:    20:02:15

Technician:   MT                                     

                                                     

MEASUREMENT RESULTS:                                       

Intervals:                                           

Rate:         95                                     

IA:           184                                    

QRSD:         98                                     

QT:           408                                    

QTc:          512                                    

Axis:                                                

P:            35                                     

IA:           184                                    

QRS:          35                                     

T:            65                                     

                                                     

INTERPRETIVE STATEMENTS:                                       

                                                     

Sinus rhythm with occasional premature ventricular complexes

Prolonged QT

Abnormal ECG

Compared to ECG 12/25/2018 09:58:22

Ventricular premature complex(es) now present



Electronically Signed On 01-15-20 07:46:16 CST by Robert Jurado

## 2020-10-12 NOTE — XMS REPORT
[facility]

 Created on:2018



Patient:Marsha Manzo

Sex:Female

:1960

External Reference #:[id number]





Demographics







 Address  [address] [place] [address]

 

 Phone  [phone]

 

 Preferred Language  en

 

 Marital Status  Unknown

 

 Uatsdin Affiliation  Unknown

 

 Race  White

 

 Ethnic Group  Not  or 









Author







 Organization  [employer]









Care Team Providers







 Name  Role  Phone

 

 Iyer, Na  Provider Role  Unavailable









Allergies, Adverse Reactions, Alerts







 Substance  Reaction  Event Type

 

 N.K.D.A.  Info Not Available  Non Drug Allergy







Problems







 Problem Type  Condition  Code  Onset Dates  Condition Status

 

 Assessment  Symptomatic spider varicose vein  I83.899    Active

 

 Assessment  Other chronic pain  G89.29    Active

 

 Assessment  Gastroesophageal reflux disease  K21.9    Active



   without esophagitis      

 

 Assessment  Cyst of skin  L72.9    Active

 

 Assessment  History of GI bleed  Z87.19    Active

 

 Problem  History of GI bleed  Z87.19    Active

 

 Problem  Pain in left knee  M25.562    Active

 

 Problem  Other chronic pain  G89.29    Active

 

 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

 

 Assessment  Pain in left knee  M25.562    Active

 

 Problem  Gastroesophageal reflux disease  K21.9    Active



   without esophagitis      

 

 Problem  Cyst of skin  L72.9    Active







Medications







 Medication  Code  Code  Instructions  Start  End  Status  Dosage



   System      Date  Date    

 

 Pantoprazole  NDC  [id number]  40 MG Orally  March    Active  1 tablet



 Sodium      Once a day  2018      







Results

No Known Results



Summary Purpose

[employer] Submission No